# Patient Record
Sex: FEMALE | Race: WHITE | NOT HISPANIC OR LATINO | Employment: FULL TIME | URBAN - METROPOLITAN AREA
[De-identification: names, ages, dates, MRNs, and addresses within clinical notes are randomized per-mention and may not be internally consistent; named-entity substitution may affect disease eponyms.]

---

## 2021-11-17 ENCOUNTER — HOSPITAL ENCOUNTER (OUTPATIENT)
Dept: CARDIOLOGY | Facility: CLINIC | Age: 51
Discharge: HOME | End: 2021-11-17
Attending: NURSE PRACTITIONER | Admitting: NURSE PRACTITIONER
Payer: COMMERCIAL

## 2021-11-17 DIAGNOSIS — C91.12 CHRONIC LYMPHOID LEUKEMIA IN RELAPSE (H): ICD-10-CM

## 2021-11-17 LAB — LVEF ECHO: NORMAL

## 2021-11-17 PROCEDURE — 93306 TTE W/DOPPLER COMPLETE: CPT | Mod: 26 | Performed by: INTERNAL MEDICINE

## 2021-11-17 PROCEDURE — 93356 MYOCRD STRAIN IMG SPCKL TRCK: CPT

## 2021-11-18 DIAGNOSIS — C83.31 DIFFUSE LARGE B-CELL LYMPHOMA, LYMPH NODES OF HEAD, FACE, AND NECK (H): Primary | ICD-10-CM

## 2021-12-07 ENCOUNTER — OFFICE VISIT (OUTPATIENT)
Dept: CARDIOLOGY | Facility: CLINIC | Age: 51
End: 2021-12-07
Attending: NURSE PRACTITIONER
Payer: COMMERCIAL

## 2021-12-07 VITALS
BODY MASS INDEX: 28.38 KG/M2 | SYSTOLIC BLOOD PRESSURE: 107 MMHG | DIASTOLIC BLOOD PRESSURE: 74 MMHG | HEART RATE: 108 BPM | WEIGHT: 166.2 LBS | HEIGHT: 64 IN

## 2021-12-07 DIAGNOSIS — I42.9 SECONDARY CARDIOMYOPATHY (H): Primary | ICD-10-CM

## 2021-12-07 DIAGNOSIS — R00.0 TACHYCARDIA: ICD-10-CM

## 2021-12-07 DIAGNOSIS — C83.31 DIFFUSE LARGE B-CELL LYMPHOMA, LYMPH NODES OF HEAD, FACE, AND NECK (H): ICD-10-CM

## 2021-12-07 PROCEDURE — 99214 OFFICE O/P EST MOD 30 MIN: CPT | Performed by: INTERNAL MEDICINE

## 2021-12-07 PROCEDURE — 93000 ELECTROCARDIOGRAM COMPLETE: CPT | Performed by: INTERNAL MEDICINE

## 2021-12-07 RX ORDER — LOSARTAN POTASSIUM 25 MG/1
25 TABLET ORAL 2 TIMES DAILY
COMMUNITY

## 2021-12-07 RX ORDER — LORAZEPAM 1 MG/1
1 TABLET ORAL EVERY 6 HOURS PRN
COMMUNITY

## 2021-12-07 RX ORDER — VENLAFAXINE 75 MG/1
225 TABLET ORAL DAILY
COMMUNITY

## 2021-12-07 RX ORDER — LACTOBACILLUS RHAMNOSUS GG 10B CELL
1 CAPSULE ORAL DAILY
COMMUNITY

## 2021-12-07 RX ORDER — CARVEDILOL 3.12 MG/1
3.12 TABLET ORAL 2 TIMES DAILY WITH MEALS
Qty: 60 TABLET | Refills: 11 | Status: SHIPPED | OUTPATIENT
Start: 2021-12-07

## 2021-12-07 RX ORDER — HYDROCHLOROTHIAZIDE 12.5 MG/1
12.5 TABLET ORAL DAILY
COMMUNITY
End: 2021-12-07

## 2021-12-07 RX ORDER — LORAZEPAM 0.5 MG/1
0.5 TABLET ORAL EVERY 6 HOURS PRN
COMMUNITY

## 2021-12-07 RX ORDER — PROCHLORPERAZINE MALEATE 5 MG
5 TABLET ORAL EVERY 6 HOURS PRN
COMMUNITY

## 2021-12-07 RX ORDER — TRAZODONE HYDROCHLORIDE 100 MG/1
100 TABLET ORAL AT BEDTIME
COMMUNITY

## 2021-12-07 RX ORDER — PRAVASTATIN SODIUM 40 MG
40 TABLET ORAL DAILY
COMMUNITY

## 2021-12-07 RX ORDER — BUSPIRONE HYDROCHLORIDE 5 MG/1
5 TABLET ORAL 2 TIMES DAILY
COMMUNITY

## 2021-12-07 RX ORDER — PREDNISONE 20 MG/1
80 TABLET ORAL
COMMUNITY

## 2021-12-07 RX ORDER — ONDANSETRON 4 MG/1
TABLET, FILM COATED ORAL EVERY 8 HOURS PRN
COMMUNITY

## 2021-12-07 ASSESSMENT — MIFFLIN-ST. JEOR: SCORE: 1363.88

## 2021-12-07 NOTE — LETTER
12/7/2021    Candis Monk  [facility] [place] [address]  [place] [address]    RE: Jaqueline Triplett       Dear Colleague,     I had the pleasure of seeing Jaqueline Triplett in the [facility].  HPI and Plan:   See dictation    Orders Placed This Encounter   Procedures     Follow-Up with Cardiologist     EKG 12-lead complete w/read - Clinics (performed today)     Echocardiogram Limited       Orders Placed This Encounter   Medications     DISCONTD: hydrochlorothiazide (HYDRODIURIL) 12.5 MG tablet     Sig: Take 12.5 mg by mouth daily     losartan (COZAAR) 25 MG tablet     Sig: Take 25 mg by mouth 2 times daily     pravastatin (PRAVACHOL) 40 MG tablet     Sig: Take 40 mg by mouth daily     venlafaxine (EFFEXOR) 75 MG tablet     Sig: Take 225 mg by mouth daily     alum & mag hydroxide-simethicone 80 mL, diphenhydrAMINE 80 mL, lidocaine (viscous) 80 mL magic mouthwash     Sig: Take 10 mLs by mouth every 6 hours as needed for mouth sores     Cranberry 1000 MG CAPS     LORazepam (ATIVAN) 0.5 MG tablet     Sig: Take 0.5 mg by mouth every 6 hours as needed for anxiety     LORazepam (ATIVAN) 1 MG tablet     Sig: Take 1 mg by mouth every 6 hours as needed for anxiety     predniSONE (DELTASONE) 20 MG tablet     Sig: Take 100 mg by mouth 100 mg daily for 5 days, with treatment, every 21 days     busPIRone (BUSPAR) 5 MG tablet     Sig: Take 5 mg by mouth 2 times daily     traZODone (DESYREL) 100 MG tablet     Sig: Take 100 mg by mouth At Bedtime     lactobacillus rhamnosus, GG, (CULTURELL) capsule     Sig: Take 1 capsule by mouth daily     Multiple Vitamin (MULTIVITAMIN ADULT PO)     ondansetron (ZOFRAN) 4 MG tablet     Sig: Take by mouth every 8 hours as needed for nausea     prochlorperazine (COMPAZINE) 5 MG tablet     Sig: Take 5 mg by mouth every 6 hours as needed for nausea or vomiting Dose unknown     carvedilol (COREG) 3.125 MG tablet     Sig: Take 1 tablet (3.125 mg) by mouth 2 times  daily (with meals)     Dispense:  60 tablet     Refill:  11       Medications Discontinued During This Encounter   Medication Reason     hydrochlorothiazide (HYDRODIURIL) 12.5 MG tablet          Encounter Diagnoses   Name Primary?     Diffuse large b-cell lymphoma, lymph nodes of head, face, and neck (H)      Secondary cardiomyopathy (H) Yes     Tachycardia        CURRENT MEDICATIONS:  Current Outpatient Medications   Medication Sig Dispense Refill     alum & mag hydroxide-simethicone 80 mL, diphenhydrAMINE 80 mL, lidocaine (viscous) 80 mL magic mouthwash Take 10 mLs by mouth every 6 hours as needed for mouth sores       busPIRone (BUSPAR) 5 MG tablet Take 5 mg by mouth 2 times daily       carvedilol (COREG) 3.125 MG tablet Take 1 tablet (3.125 mg) by mouth 2 times daily (with meals) 60 tablet 11     Cranberry 1000 MG CAPS        lactobacillus rhamnosus, GG, (CULTURELL) capsule Take 1 capsule by mouth daily       LORazepam (ATIVAN) 0.5 MG tablet Take 0.5 mg by mouth every 6 hours as needed for anxiety       LORazepam (ATIVAN) 1 MG tablet Take 1 mg by mouth every 6 hours as needed for anxiety       losartan (COZAAR) 25 MG tablet Take 25 mg by mouth 2 times daily       Multiple Vitamin (MULTIVITAMIN ADULT PO)        ondansetron (ZOFRAN) 4 MG tablet Take by mouth every 8 hours as needed for nausea       pravastatin (PRAVACHOL) 40 MG tablet Take 40 mg by mouth daily       predniSONE (DELTASONE) 20 MG tablet Take 100 mg by mouth 100 mg daily for 5 days, with treatment, every 21 days       prochlorperazine (COMPAZINE) 5 MG tablet Take 5 mg by mouth every 6 hours as needed for nausea or vomiting Dose unknown       traZODone (DESYREL) 100 MG tablet Take 100 mg by mouth At Bedtime       venlafaxine (EFFEXOR) 75 MG tablet Take 225 mg by mouth daily         ALLERGIES   No Known Allergies    PAST MEDICAL HISTORY:  History reviewed. No pertinent past medical history.    PAST SURGICAL HISTORY:  History reviewed. No pertinent  "surgical history.    FAMILY HISTORY:  Family History   Problem Relation Age of Onset     Cerebrovascular Disease Father      Cerebrovascular Disease Brother        SOCIAL HISTORY:  Social History     Socioeconomic History     Marital status:      Spouse name: None     Number of children: None     Years of education: None     Highest education level: None   Occupational History     None   Tobacco Use     Smoking status: Former Smoker     Years: 2.00     Smokeless tobacco: Never Used   Substance and Sexual Activity     Alcohol use: Not Currently     Drug use: None     Sexual activity: None   Other Topics Concern     Parent/sibling w/ CABG, MI or angioplasty before 65F 55M? Not Asked   Social History Narrative     None     Social Determinants of Health     Financial Resource Strain: Not on file   Food Insecurity: Not on file   Transportation Needs: Not on file   Physical Activity: Not on file   Stress: Not on file   Social Connections: Not on file   Intimate Partner Violence: Not on file   Housing Stability: Not on file       Review of Systems:  Skin:  Positive for bruising     Eyes:  Positive for contacts    ENT:  Negative      Respiratory:  Negative       Cardiovascular:  Negative;chest pain;syncope or near-syncope;dizziness;lightheadedness;cyanosis palpitations;Positive for;fatigue    Gastroenterology: Negative      Genitourinary:  not assessed      Musculoskeletal:  Negative      Neurologic:  Positive for headaches    Psychiatric:  Positive for sleep disturbances    Heme/Lymph/Imm:  Positive for easy bruising    Endocrine:  Negative        Physical Exam:  Vitals: /74 (BP Location: Right arm, Cuff Size: Adult Large)   Pulse 108   Ht 1.626 m (5' 4\")   Wt 75.4 kg (166 lb 3.2 oz)   BMI 28.53 kg/m      Constitutional:  cooperative        Skin:  warm and dry to the touch          Head:  no masses or lesions        Eyes:  pupils equal and round        Lymph:      ENT:  no pallor or cyanosis        Neck:  " no carotid bruit        Respiratory:  clear to auscultation;normal symmetry         Cardiac: regular rhythm;no murmurs, gallops or rubs detected tachycardic              pulses full and equal                                        GI:  abdomen soft;no bruits        Extremities and Muscular Skeletal:  no deformities, clubbing, cyanosis, erythema observed;no edema              Neurological:  no gross motor deficits;affect appropriate        Psych:             CC  Nisa Conde, APRN CNP  [facility]  [address] [address]  [place] [address]                      Thank you for allowing me to participate in the care of your patient.      Sincerely,     Susana Hopper DO     [facility]  cc:   JACQUELINE Davidson CNP  [facility]  [address] [address]  [place] [address]

## 2021-12-07 NOTE — LETTER
12/7/2021       RE: Jaqueline Triplett  [address] [facility] [address]     Dear Colleague,    Thank you for referring your patient, Jaqueline Triplett, to the [facility] at [facility]. Please see a copy of my visit note below.    HPI and Plan:   See dictation    Orders Placed This Encounter   Procedures     Follow-Up with Cardiologist     EKG 12-lead complete w/read - Clinics (performed today)     Echocardiogram Limited       Orders Placed This Encounter   Medications     DISCONTD: hydrochlorothiazide (HYDRODIURIL) 12.5 MG tablet     Sig: Take 12.5 mg by mouth daily     losartan (COZAAR) 25 MG tablet     Sig: Take 25 mg by mouth 2 times daily     pravastatin (PRAVACHOL) 40 MG tablet     Sig: Take 40 mg by mouth daily     venlafaxine (EFFEXOR) 75 MG tablet     Sig: Take 225 mg by mouth daily     alum & mag hydroxide-simethicone 80 mL, diphenhydrAMINE 80 mL, lidocaine (viscous) 80 mL magic mouthwash     Sig: Take 10 mLs by mouth every 6 hours as needed for mouth sores     Cranberry 1000 MG CAPS     LORazepam (ATIVAN) 0.5 MG tablet     Sig: Take 0.5 mg by mouth every 6 hours as needed for anxiety     LORazepam (ATIVAN) 1 MG tablet     Sig: Take 1 mg by mouth every 6 hours as needed for anxiety     predniSONE (DELTASONE) 20 MG tablet     Sig: Take 100 mg by mouth 100 mg daily for 5 days, with treatment, every 21 days     busPIRone (BUSPAR) 5 MG tablet     Sig: Take 5 mg by mouth 2 times daily     traZODone (DESYREL) 100 MG tablet     Sig: Take 100 mg by mouth At Bedtime     lactobacillus rhamnosus, GG, (CULTURELL) capsule     Sig: Take 1 capsule by mouth daily     Multiple Vitamin (MULTIVITAMIN ADULT PO)     ondansetron (ZOFRAN) 4 MG tablet     Sig: Take by mouth every 8 hours as needed for nausea     prochlorperazine (COMPAZINE) 5 MG tablet     Sig: Take 5 mg by mouth every 6 hours as needed for nausea or vomiting Dose unknown      carvedilol (COREG) 3.125 MG tablet     Sig: Take 1 tablet (3.125 mg) by mouth 2 times daily (with meals)     Dispense:  60 tablet     Refill:  11       Medications Discontinued During This Encounter   Medication Reason     hydrochlorothiazide (HYDRODIURIL) 12.5 MG tablet          Encounter Diagnoses   Name Primary?     Diffuse large b-cell lymphoma, lymph nodes of head, face, and neck (H)      Secondary cardiomyopathy (H) Yes     Tachycardia        CURRENT MEDICATIONS:  Current Outpatient Medications   Medication Sig Dispense Refill     alum & mag hydroxide-simethicone 80 mL, diphenhydrAMINE 80 mL, lidocaine (viscous) 80 mL magic mouthwash Take 10 mLs by mouth every 6 hours as needed for mouth sores       busPIRone (BUSPAR) 5 MG tablet Take 5 mg by mouth 2 times daily       carvedilol (COREG) 3.125 MG tablet Take 1 tablet (3.125 mg) by mouth 2 times daily (with meals) 60 tablet 11     Cranberry 1000 MG CAPS        lactobacillus rhamnosus, GG, (CULTURELL) capsule Take 1 capsule by mouth daily       LORazepam (ATIVAN) 0.5 MG tablet Take 0.5 mg by mouth every 6 hours as needed for anxiety       LORazepam (ATIVAN) 1 MG tablet Take 1 mg by mouth every 6 hours as needed for anxiety       losartan (COZAAR) 25 MG tablet Take 25 mg by mouth 2 times daily       Multiple Vitamin (MULTIVITAMIN ADULT PO)        ondansetron (ZOFRAN) 4 MG tablet Take by mouth every 8 hours as needed for nausea       pravastatin (PRAVACHOL) 40 MG tablet Take 40 mg by mouth daily       predniSONE (DELTASONE) 20 MG tablet Take 100 mg by mouth 100 mg daily for 5 days, with treatment, every 21 days       prochlorperazine (COMPAZINE) 5 MG tablet Take 5 mg by mouth every 6 hours as needed for nausea or vomiting Dose unknown       traZODone (DESYREL) 100 MG tablet Take 100 mg by mouth At Bedtime       venlafaxine (EFFEXOR) 75 MG tablet Take 225 mg by mouth daily         ALLERGIES   No Known Allergies    PAST MEDICAL HISTORY:  History reviewed. No  "pertinent past medical history.    PAST SURGICAL HISTORY:  History reviewed. No pertinent surgical history.    FAMILY HISTORY:  Family History   Problem Relation Age of Onset     Cerebrovascular Disease Father      Cerebrovascular Disease Brother        SOCIAL HISTORY:  Social History     Socioeconomic History     Marital status:      Spouse name: None     Number of children: None     Years of education: None     Highest education level: None   Occupational History     None   Tobacco Use     Smoking status: Former Smoker     Years: 2.00     Smokeless tobacco: Never Used   Substance and Sexual Activity     Alcohol use: Not Currently     Drug use: None     Sexual activity: None   Other Topics Concern     Parent/sibling w/ CABG, MI or angioplasty before 65F 55M? Not Asked   Social History Narrative     None     Social Determinants of Health     Financial Resource Strain: Not on file   Food Insecurity: Not on file   Transportation Needs: Not on file   Physical Activity: Not on file   Stress: Not on file   Social Connections: Not on file   Intimate Partner Violence: Not on file   Housing Stability: Not on file       Review of Systems:  Skin:  Positive for bruising     Eyes:  Positive for contacts    ENT:  Negative      Respiratory:  Negative       Cardiovascular:  Negative;chest pain;syncope or near-syncope;dizziness;lightheadedness;cyanosis palpitations;Positive for;fatigue    Gastroenterology: Negative      Genitourinary:  not assessed      Musculoskeletal:  Negative      Neurologic:  Positive for headaches    Psychiatric:  Positive for sleep disturbances    Heme/Lymph/Imm:  Positive for easy bruising    Endocrine:  Negative        Physical Exam:  Vitals: /74 (BP Location: Right arm, Cuff Size: Adult Large)   Pulse 108   Ht 1.626 m (5' 4\")   Wt 75.4 kg (166 lb 3.2 oz)   BMI 28.53 kg/m      Constitutional:  cooperative        Skin:  warm and dry to the touch          Head:  no masses or lesions    "     Eyes:  pupils equal and round        Lymph:      ENT:  no pallor or cyanosis        Neck:  no carotid bruit        Respiratory:  clear to auscultation;normal symmetry         Cardiac: regular rhythm;no murmurs, gallops or rubs detected tachycardic              pulses full and equal                                        GI:  abdomen soft;no bruits        Extremities and Muscular Skeletal:  no deformities, clubbing, cyanosis, erythema observed;no edema              Neurological:  no gross motor deficits;affect appropriate        Psych:             CC  Nisa Shell Houselog, APRN [facility]  [address]  [place] [address]                    Service Date: 12/07/2021    REFERRING PROVIDER:  Dr. Candis Monk.    HISTORY OF PRESENT ILLNESS:  I have been asked to evaluate this very pleasant 51-year-old female for the above.  She has a history of chronic lymphocytic leukemia that has transformed into large cell lymphoma.  She initially was treated down in [place] when she was diagnosed in July with cyclophosphamide, fludarabine and rituximab.  She did have a dose adjustment due to some intolerance.  She then moved back to [place] and was seen in Medical Oncology in October, and that is where she was diagnosed with diffuse large B-cell lymphoma and underwent a PET scan showing several areas of increased activity and lymph node biopsy 10/29 showing the diffuse large B-cell lymphoma.  She underwent a MUGA scan on 11/05, and then she underwent an echocardiogram on 11/17, preceding her first R-CHOP therapy that was given on the 18th.  The MUGA scan, which was done down in [place] I believe, did show mild LV dysfunction which was global.  The EF was estimated around 47%.  On the echocardiogram from the 17th, her EF was estimated at 50%-55% with borderline global hypokinesia.  Her global strain was slightly low at -17.6.  RV function looked normal, and there were no valvular  abnormalities and no pericardial effusion was noted.  She did have significant side effects from her first CHOP cycle including mucous ulcerations in her mouth, high fever of unknown origin, nausea and lack of appetite.  This lasted for about a week before it subsided.  She is currently not complaining of any symptoms other than tiredness.  She has no prior history of heart disease, although she does have a family history.  Her father ended up having a heart transplant in his 60s.  She has a younger brother that has had a stroke.  She has a very remote tobacco history.  No history of diabetes.  She is on 2 medications for blood pressure control, which she has been on for several years, including losartan and hydrochlorothiazide.  She tells me she had a stress test years ago, and she believes she may have had an echocardiogram years ago as well.  She was never told these were abnormal.  We did perform an electrocardiogram in office today, which I have reviewed.  This essentially shows a sinus tachycardia but no acute ST or T-wave abnormalities, axis is normal, QT corrected interval is also normal.    PHYSICAL EXAMINATION:    VITAL SIGNS:  On exam today, her blood pressure is 107/74, pulse is 108.  She does tell me that she has always had a high pulse rate.  Her weight is 166 pounds with a body mass index of 28.  NECK:  Carotid upstrokes are brisk without bruit.  CARDIOVASCULAR:  Heart tones are regular but fast.  I did not appreciate a murmur, gallop or rub.  RESPIRATORY:  Lungs are clear posteriorly.  EXTREMITIES:  She has no peripheral edema.    ASSESSMENT AND PLAN:  In summary, Ms. Triplett is a very pleasant 51-year-old female with a history of chronic lymphocytic leukemia, transformed into large diffuse B-cell lymphoma.  She had initially undergone therapy with cyclophosphamide, fludarabine and rituximab down in [place], and when she was diagnosed with the B-cell lymphoma, she has started now started CHOP  therapy, which her first of 6 cycles was performed on .  She has borderline low LV systolic function with no prior history of heart disease.  Her risk factors include family history of coronary disease and hypertension.  At this time, I am going to recommend the following:  I would like her to have a troponin level drawn within 24-48 hours after her second cycle, and I have written her a lab script for this.  I would like to change her antihypertensive regimen to be a little bit more cardioprotective.  I do not feel she is going to tolerate a diuretic well with her side effects she has been experiencing with the chemo, so I am going to take her off of hydrochlorothiazide and put her on low-dose carvedilol 3.125 mg b.i.d.  I will have her continue the losartan at 25 mg b.i.d., but if her blood pressure becomes too low or she gets lightheaded, I may back off on this to allow the addition of the carvedilol.  I have asked her to purchase a blood pressure cuff machine, if she hasn't already, to help guide us when she is having significant side effects, to make sure that she can tolerate these medications without any significant hypotension, and I will ask her to also undergo a limited echo following her second cycle, given that we are starting borderline low with her EF and global strain pattern.  Otherwise, I will plan to see her back every 3 months.    Please feel free to contact me with any questions you have in regards to her care.  Thank you for allowing me to participate in the care of your nice patient.    Susana Hopper DO    cc:  [place] [address]    Mehdi Temple MD  [facility] [facility]  [address] [place] [address], [address]  [place]  [address]    Susana Hopper DO        D: 2021   T: 2021   MT: singh    Name:     GERA [place]  MRN:      [id number]        Account:      [id number]   :       1970           Service Date: 12/07/2021       Document: [id number]

## 2021-12-07 NOTE — PROGRESS NOTES
Service Date: 12/07/2021    REFERRING PROVIDER:  Dr. Candis Monk.    HISTORY OF PRESENT ILLNESS:  I have been asked to evaluate this very pleasant 51-year-old female for the above.  She has a history of chronic lymphocytic leukemia that has transformed into large cell lymphoma.  She initially was treated down in [place] when she was diagnosed in July with cyclophosphamide, fludarabine and rituximab.  She did have a dose adjustment due to some intolerance.  She then moved back to [place] and was seen in Medical Oncology in October, and that is where she was diagnosed with diffuse large B-cell lymphoma and underwent a PET scan showing several areas of increased activity and lymph node biopsy 10/29 showing the diffuse large B-cell lymphoma.  She underwent a MUGA scan on 11/05, and then she underwent an echocardiogram on 11/17, preceding her first R-CHOP therapy that was given on the 18th.  The MUGA scan, which was done down in [place] I believe, did show mild LV dysfunction which was global.  The EF was estimated around 47%.  On the echocardiogram from the 17th, her EF was estimated at 50%-55% with borderline global hypokinesia.  Her global strain was slightly low at -17.6.  RV function looked normal, and there were no valvular abnormalities and no pericardial effusion was noted.  She did have significant side effects from her first CHOP cycle including mucous ulcerations in her mouth, high fever of unknown origin, nausea and lack of appetite.  This lasted for about a week before it subsided.  She is currently not complaining of any symptoms other than tiredness.  She has no prior history of heart disease, although she does have a family history.  Her father ended up having a heart transplant in his 60s.  She has a younger brother that has had a stroke.  She has a very remote tobacco history.  No history of diabetes.  She is on 2 medications for blood pressure control, which she has been on for several years,  including losartan and hydrochlorothiazide.  She tells me she had a stress test years ago, and she believes she may have had an echocardiogram years ago as well.  She was never told these were abnormal.  We did perform an electrocardiogram in office today, which I have reviewed.  This essentially shows a sinus tachycardia but no acute ST or T-wave abnormalities, axis is normal, QT corrected interval is also normal.    PHYSICAL EXAMINATION:    VITAL SIGNS:  On exam today, her blood pressure is 107/74, pulse is 108.  She does tell me that she has always had a high pulse rate.  Her weight is 166 pounds with a body mass index of 28.  NECK:  Carotid upstrokes are brisk without bruit.  CARDIOVASCULAR:  Heart tones are regular but fast.  I did not appreciate a murmur, gallop or rub.  RESPIRATORY:  Lungs are clear posteriorly.  EXTREMITIES:  She has no peripheral edema.    ASSESSMENT AND PLAN:  In summary, Ms. Triplett is a very pleasant 51-year-old female with a history of chronic lymphocytic leukemia, transformed into large diffuse B-cell lymphoma.  She had initially undergone therapy with cyclophosphamide, fludarabine and rituximab down in [place], and when she was diagnosed with the B-cell lymphoma, she has started now started CHOP therapy, which her first of 6 cycles was performed on 11/18.  She has borderline low LV systolic function with no prior history of heart disease.  Her risk factors include family history of coronary disease and hypertension.  At this time, I am going to recommend the following:  I would like her to have a troponin level drawn within 24-48 hours after her second cycle, and I have written her a lab script for this.  I would like to change her antihypertensive regimen to be a little bit more cardioprotective.  I do not feel she is going to tolerate a diuretic well with her side effects she has been experiencing with the chemo, so I am going to take her off of hydrochlorothiazide and put her on  low-dose carvedilol 3.125 mg b.i.d.  I will have her continue the losartan at 25 mg b.i.d., but if her blood pressure becomes too low or she gets lightheaded, I may back off on this to allow the addition of the carvedilol.  I have asked her to purchase a blood pressure cuff machine, if she hasn't already, to help guide us when she is having significant side effects, to make sure that she can tolerate these medications without any significant hypotension, and I will ask her to also undergo a limited echo following her second cycle, given that we are starting borderline low with her EF and global strain pattern.  Otherwise, I will plan to see her back every 3 months.    Please feel free to contact me with any questions you have in regards to her care.  Thank you for allowing me to participate in the care of your nice patient.    Susana Hopper DO    cc:  [place] [address]    Mehdi Temple MD  [facility]  [address] [place] [address], [address]  [place] [address]    Susana Hopper DO        D: 2021   T: 2021   MT: singh    Name:     GERA NELSONDarby  MRN:      [id number]        Account:      [id number]   :      1970           Service Date: 2021       Document: [id number]

## 2021-12-07 NOTE — PROGRESS NOTES
HPI and Plan:   See dictation    Orders Placed This Encounter   Procedures     Follow-Up with Cardiologist     EKG 12-lead complete w/read - Clinics (performed today)     Echocardiogram Limited       Orders Placed This Encounter   Medications     DISCONTD: hydrochlorothiazide (HYDRODIURIL) 12.5 MG tablet     Sig: Take 12.5 mg by mouth daily     losartan (COZAAR) 25 MG tablet     Sig: Take 25 mg by mouth 2 times daily     pravastatin (PRAVACHOL) 40 MG tablet     Sig: Take 40 mg by mouth daily     venlafaxine (EFFEXOR) 75 MG tablet     Sig: Take 225 mg by mouth daily     alum & mag hydroxide-simethicone 80 mL, diphenhydrAMINE 80 mL, lidocaine (viscous) 80 mL magic mouthwash     Sig: Take 10 mLs by mouth every 6 hours as needed for mouth sores     Cranberry 1000 MG CAPS     LORazepam (ATIVAN) 0.5 MG tablet     Sig: Take 0.5 mg by mouth every 6 hours as needed for anxiety     LORazepam (ATIVAN) 1 MG tablet     Sig: Take 1 mg by mouth every 6 hours as needed for anxiety     predniSONE (DELTASONE) 20 MG tablet     Sig: Take 100 mg by mouth 100 mg daily for 5 days, with treatment, every 21 days     busPIRone (BUSPAR) 5 MG tablet     Sig: Take 5 mg by mouth 2 times daily     traZODone (DESYREL) 100 MG tablet     Sig: Take 100 mg by mouth At Bedtime     lactobacillus rhamnosus, GG, (CULTURELL) capsule     Sig: Take 1 capsule by mouth daily     Multiple Vitamin (MULTIVITAMIN ADULT PO)     ondansetron (ZOFRAN) 4 MG tablet     Sig: Take by mouth every 8 hours as needed for nausea     prochlorperazine (COMPAZINE) 5 MG tablet     Sig: Take 5 mg by mouth every 6 hours as needed for nausea or vomiting Dose unknown     carvedilol (COREG) 3.125 MG tablet     Sig: Take 1 tablet (3.125 mg) by mouth 2 times daily (with meals)     Dispense:  60 tablet     Refill:  11       Medications Discontinued During This Encounter   Medication Reason     hydrochlorothiazide (HYDRODIURIL) 12.5 MG tablet          Encounter Diagnoses   Name Primary?      Diffuse large b-cell lymphoma, lymph nodes of head, face, and neck (H)      Secondary cardiomyopathy (H) Yes     Tachycardia        CURRENT MEDICATIONS:  Current Outpatient Medications   Medication Sig Dispense Refill     alum & mag hydroxide-simethicone 80 mL, diphenhydrAMINE 80 mL, lidocaine (viscous) 80 mL magic mouthwash Take 10 mLs by mouth every 6 hours as needed for mouth sores       busPIRone (BUSPAR) 5 MG tablet Take 5 mg by mouth 2 times daily       carvedilol (COREG) 3.125 MG tablet Take 1 tablet (3.125 mg) by mouth 2 times daily (with meals) 60 tablet 11     Cranberry 1000 MG CAPS        lactobacillus rhamnosus, GG, (CULTURELL) capsule Take 1 capsule by mouth daily       LORazepam (ATIVAN) 0.5 MG tablet Take 0.5 mg by mouth every 6 hours as needed for anxiety       LORazepam (ATIVAN) 1 MG tablet Take 1 mg by mouth every 6 hours as needed for anxiety       losartan (COZAAR) 25 MG tablet Take 25 mg by mouth 2 times daily       Multiple Vitamin (MULTIVITAMIN ADULT PO)        ondansetron (ZOFRAN) 4 MG tablet Take by mouth every 8 hours as needed for nausea       pravastatin (PRAVACHOL) 40 MG tablet Take 40 mg by mouth daily       predniSONE (DELTASONE) 20 MG tablet Take 100 mg by mouth 100 mg daily for 5 days, with treatment, every 21 days       prochlorperazine (COMPAZINE) 5 MG tablet Take 5 mg by mouth every 6 hours as needed for nausea or vomiting Dose unknown       traZODone (DESYREL) 100 MG tablet Take 100 mg by mouth At Bedtime       venlafaxine (EFFEXOR) 75 MG tablet Take 225 mg by mouth daily         ALLERGIES   No Known Allergies    PAST MEDICAL HISTORY:  History reviewed. No pertinent past medical history.    PAST SURGICAL HISTORY:  History reviewed. No pertinent surgical history.    FAMILY HISTORY:  Family History   Problem Relation Age of Onset     Cerebrovascular Disease Father      Cerebrovascular Disease Brother        SOCIAL HISTORY:  Social History     Socioeconomic History     Marital  "status:      Spouse name: None     Number of children: None     Years of education: None     Highest education level: None   Occupational History     None   Tobacco Use     Smoking status: Former Smoker     Years: 2.00     Smokeless tobacco: Never Used   Substance and Sexual Activity     Alcohol use: Not Currently     Drug use: None     Sexual activity: None   Other Topics Concern     Parent/sibling w/ CABG, MI or angioplasty before 65F 55M? Not Asked   Social History Narrative     None     Social Determinants of Health     Financial Resource Strain: Not on file   Food Insecurity: Not on file   Transportation Needs: Not on file   Physical Activity: Not on file   Stress: Not on file   Social Connections: Not on file   Intimate Partner Violence: Not on file   Housing Stability: Not on file       Review of Systems:  Skin:  Positive for bruising     Eyes:  Positive for contacts    ENT:  Negative      Respiratory:  Negative       Cardiovascular:  Negative;chest pain;syncope or near-syncope;dizziness;lightheadedness;cyanosis palpitations;Positive for;fatigue    Gastroenterology: Negative      Genitourinary:  not assessed      Musculoskeletal:  Negative      Neurologic:  Positive for headaches    Psychiatric:  Positive for sleep disturbances    Heme/Lymph/Imm:  Positive for easy bruising    Endocrine:  Negative        Physical Exam:  Vitals: /74 (BP Location: Right arm, Cuff Size: Adult Large)   Pulse 108   Ht 1.626 m (5' 4\")   Wt 75.4 kg (166 lb 3.2 oz)   BMI 28.53 kg/m      Constitutional:  cooperative        Skin:  warm and dry to the touch          Head:  no masses or lesions        Eyes:  pupils equal and round        Lymph:      ENT:  no pallor or cyanosis        Neck:  no carotid bruit        Respiratory:  clear to auscultation;normal symmetry         Cardiac: regular rhythm;no murmurs, gallops or rubs detected tachycardic              pulses full and equal                                    "     GI:  abdomen soft;no bruits        Extremities and Muscular Skeletal:  no deformities, clubbing, cyanosis, erythema observed;no edema              Neurological:  no gross motor deficits;affect appropriate        Psych:             CC  Nisa Geronimolog, APRN CNP  [facility]  [address] [facility] [address]  [place] [address]

## 2021-12-10 ENCOUNTER — TRANSFERRED RECORDS (OUTPATIENT)
Dept: HEALTH INFORMATION MANAGEMENT | Facility: CLINIC | Age: 51
End: 2021-12-10
Payer: COMMERCIAL

## 2021-12-13 ENCOUNTER — TELEPHONE (OUTPATIENT)
Dept: CARDIOLOGY | Facility: CLINIC | Age: 51
End: 2021-12-13
Payer: COMMERCIAL

## 2021-12-13 DIAGNOSIS — I42.9 SECONDARY CARDIOMYOPATHY (H): Primary | ICD-10-CM

## 2021-12-13 NOTE — TELEPHONE ENCOUNTER
"Received call from pt stating she is having fluid retention and shortness of breath since making medication changes after her visit on 12/7/21 with Dr. Hopper. Per note:     \"At this time, I am going to recommend the following:  I would like her to have a troponin level drawn within 24-48 hours after her second cycle, and I have written her a lab script for this.  I would like to change her antihypertensive regimen to be a little bit more cardioprotective.  I do not feel she is going to tolerate a diuretic well with her side effects she has been experiencing with the chemo, so I am going to take her off of hydrochlorothiazide and put her on low-dose carvedilol 3.125 mg b.i.d.  I will have her continue the losartan at 25 mg b.i.d., but if her blood pressure becomes too low or she gets lightheaded, I may back off on this to allow the addition of the carvedilol.  I have asked her to purchase a blood pressure cuff machine, if she hasn't already, to help guide us when she is having significant side effects, to make sure that she can tolerate these medications without any significant hypotension, and I will ask her to also undergo a limited echo following her second cycle, given that we are starting borderline low with her EF and global strain pattern.\"    Pt stated she has noted some new shortness of breath on exertion such as when climbing stairs in her home the past few days. Pt stated she also feels like she is retaining fluid. Pt stated her weight on her home scale this morning was 171 lbs which is up about 5 lbs in the past week. Pt's weight in clinic on 12/7/21 was 166 lbs. Pt said her rings are tight and she has also noted some indentation in her skin when she removes her socks. Pt stated she is checking her BP at home, reported her BP today was 103/57 with HR 90. Pt asked if she should go back on the hydrochlorothiazide or make other medication changes. Advised can review with Dr. Hopper and call back with " recommendations.

## 2021-12-14 RX ORDER — HYDROCHLOROTHIAZIDE 12.5 MG/1
12.5 TABLET ORAL DAILY
Qty: 90 TABLET | Refills: 3
Start: 2021-12-14

## 2021-12-14 NOTE — TELEPHONE ENCOUNTER
Received response from Dr. Hopper:     Susana Hopper, Josephine Roach, RN  Caller: Unspecified (Yesterday, 10:22 AM)  She can try adding the hydrochlorothiazide, however I would continue coreg and losartan they are cardioprotective while she is undergoing chemo.     Called back and spoke with pt, reviewed recommendation from Dr. Hopper that pt can try adding back the hydrochlorothiazide 12.5 mg daily. Reviewed Dr. Hopper recommended continuing carvedilol 3.125 mg twice daily and losartan 25 mg twice daily as they are cardioprotective while pt is undergoing chemo. Requested pt let us know if she has issues with low BP. Pt verbalized understanding and agreement with plan. Pt stated she does not need a prescription for hydrochlorothiazide at this time. Med list update.

## 2021-12-29 ENCOUNTER — HOSPITAL ENCOUNTER (OUTPATIENT)
Dept: CARDIOLOGY | Facility: CLINIC | Age: 51
Discharge: HOME | End: 2021-12-29
Attending: INTERNAL MEDICINE | Admitting: INTERNAL MEDICINE
Payer: COMMERCIAL

## 2021-12-29 DIAGNOSIS — R00.0 TACHYCARDIA: ICD-10-CM

## 2021-12-29 DIAGNOSIS — I42.9 SECONDARY CARDIOMYOPATHY (H): ICD-10-CM

## 2021-12-29 DIAGNOSIS — C83.31 DIFFUSE LARGE B-CELL LYMPHOMA, LYMPH NODES OF HEAD, FACE, AND NECK (H): ICD-10-CM

## 2021-12-29 LAB — BI-PLANE LVEF ECHO: NORMAL

## 2021-12-29 PROCEDURE — 93325 DOPPLER ECHO COLOR FLOW MAPG: CPT | Mod: 26 | Performed by: INTERNAL MEDICINE

## 2021-12-29 PROCEDURE — 93308 TTE F-UP OR LMTD: CPT | Mod: 26 | Performed by: INTERNAL MEDICINE

## 2021-12-29 PROCEDURE — 93321 DOPPLER ECHO F-UP/LMTD STD: CPT | Mod: 26 | Performed by: INTERNAL MEDICINE

## 2021-12-29 PROCEDURE — 93321 DOPPLER ECHO F-UP/LMTD STD: CPT

## 2021-12-30 ENCOUNTER — TRANSFERRED RECORDS (OUTPATIENT)
Dept: HEALTH INFORMATION MANAGEMENT | Facility: CLINIC | Age: 51
End: 2021-12-30
Payer: COMMERCIAL

## 2022-01-11 ENCOUNTER — OFFICE VISIT (OUTPATIENT)
Dept: CARDIOLOGY | Facility: CLINIC | Age: 52
End: 2022-01-11
Attending: INTERNAL MEDICINE
Payer: COMMERCIAL

## 2022-01-11 VITALS
HEART RATE: 96 BPM | WEIGHT: 171.3 LBS | HEIGHT: 64 IN | SYSTOLIC BLOOD PRESSURE: 111 MMHG | DIASTOLIC BLOOD PRESSURE: 76 MMHG | BODY MASS INDEX: 29.24 KG/M2

## 2022-01-11 DIAGNOSIS — C83.31 DIFFUSE LARGE B-CELL LYMPHOMA, LYMPH NODES OF HEAD, FACE, AND NECK (H): ICD-10-CM

## 2022-01-11 DIAGNOSIS — I42.9 SECONDARY CARDIOMYOPATHY (H): ICD-10-CM

## 2022-01-11 DIAGNOSIS — R00.0 TACHYCARDIA: ICD-10-CM

## 2022-01-11 PROCEDURE — 99214 OFFICE O/P EST MOD 30 MIN: CPT | Performed by: INTERNAL MEDICINE

## 2022-01-11 RX ORDER — ACETAMINOPHEN 500 MG
1000 TABLET ORAL DAILY
COMMUNITY

## 2022-01-11 ASSESSMENT — MIFFLIN-ST. JEOR: SCORE: 1387.01

## 2022-01-11 NOTE — LETTER
1/11/2022    Candis Monk  [facility] [address] [place], [place] [address]  [facility] [address]    RE: Jaqueline Triplett       Dear Colleague,     I had the pleasure of seeing Jaqueline Triplett in the [facility].  HPI and Plan:   See dictation    No orders of the defined types were placed in this encounter.      Orders Placed This Encounter   Medications     acetaminophen (TYLENOL) 500 MG tablet     Sig: Take 1,000 mg by mouth daily       There are no discontinued medications.      Encounter Diagnoses   Name Primary?     Diffuse large b-cell lymphoma, lymph nodes of head, face, and neck (H)      Secondary cardiomyopathy (H)      Tachycardia        CURRENT MEDICATIONS:  Current Outpatient Medications   Medication Sig Dispense Refill     acetaminophen (TYLENOL) 500 MG tablet Take 1,000 mg by mouth daily       alum & mag hydroxide-simethicone 80 mL, diphenhydrAMINE 80 mL, lidocaine (viscous) 80 mL magic mouthwash Take 10 mLs by mouth every 6 hours as needed for mouth sores       busPIRone (BUSPAR) 5 MG tablet Take 5 mg by mouth 2 times daily       carvedilol (COREG) 3.125 MG tablet Take 1 tablet (3.125 mg) by mouth 2 times daily (with meals) 60 tablet 11     Cranberry 1000 MG CAPS        hydrochlorothiazide (HYDRODIURIL) 12.5 MG tablet Take 1 tablet (12.5 mg) by mouth daily 90 tablet 3     lactobacillus rhamnosus, GG, (CULTURELL) capsule Take 1 capsule by mouth daily       LORazepam (ATIVAN) 0.5 MG tablet Take 0.5 mg by mouth every 6 hours as needed for anxiety       LORazepam (ATIVAN) 1 MG tablet Take 1 mg by mouth every 6 hours as needed for anxiety       losartan (COZAAR) 25 MG tablet Take 25 mg by mouth 2 times daily       Multiple Vitamin (MULTIVITAMIN ADULT PO)        ondansetron (ZOFRAN) 4 MG tablet Take by mouth every 8 hours as needed for nausea       pravastatin (PRAVACHOL) 40 MG tablet Take 40 mg by mouth daily       predniSONE (DELTASONE) 20 MG tablet Take 100 mg by mouth 100 mg daily for 5  days, with treatment, every 21 days       prochlorperazine (COMPAZINE) 5 MG tablet Take 5 mg by mouth every 6 hours as needed for nausea or vomiting Dose unknown       traZODone (DESYREL) 100 MG tablet Take 100 mg by mouth At Bedtime       venlafaxine (EFFEXOR) 75 MG tablet Take 225 mg by mouth daily         ALLERGIES   No Known Allergies    PAST MEDICAL HISTORY:  History reviewed. No pertinent past medical history.    PAST SURGICAL HISTORY:  History reviewed. No pertinent surgical history.    FAMILY HISTORY:  Family History   Problem Relation Age of Onset     Cerebrovascular Disease Father      Cerebrovascular Disease Brother        SOCIAL HISTORY:  Social History     Socioeconomic History     Marital status:      Spouse name: None     Number of children: None     Years of education: None     Highest education level: None   Occupational History     None   Tobacco Use     Smoking status: Former Smoker     Years: 2.00     Smokeless tobacco: Never Used   Substance and Sexual Activity     Alcohol use: Not Currently     Drug use: None     Sexual activity: None   Other Topics Concern     Parent/sibling w/ CABG, MI or angioplasty before 65F 55M? Not Asked   Social History Narrative     None     Social Determinants of Health     Financial Resource Strain: Not on file   Food Insecurity: Not on file   Transportation Needs: Not on file   Physical Activity: Not on file   Stress: Not on file   Social Connections: Not on file   Intimate Partner Violence: Not on file   Housing Stability: Not on file       Review of Systems:  Skin:  Positive for rash     Eyes:  Negative      ENT:  Negative      Respiratory:  Positive for dyspnea on exertion     Cardiovascular:  Negative;palpitations;chest pain;lightheadedness;dizziness;syncope or near-syncope;cyanosis;edema Positive for;fatigue    Gastroenterology: Positive for constipation    Genitourinary:  Negative      Musculoskeletal:  Negative      Neurologic:  Positive for headaches  "   Psychiatric:  Positive for anxiety;depression;excessive stress;sleep disturbances    Heme/Lymph/Imm:  Negative      Endocrine:  Negative        Physical Exam:  Vitals: /76 (BP Location: Left arm, Cuff Size: Adult Large)   Pulse 96   Ht 1.626 m (5' 4\")   Wt 77.7 kg (171 lb 4.8 oz)   BMI 29.40 kg/m      Constitutional:  cooperative        Skin:  warm and dry to the touch          Head:  no masses or lesions        Eyes:  pupils equal and round        Lymph:      ENT:  no pallor or cyanosis        Neck:  no carotid bruit        Respiratory:  clear to auscultation;normal symmetry         Cardiac: regular rhythm;no murmurs, gallops or rubs detected tachycardic              pulses full and equal                                        GI:  abdomen soft;no bruits        Extremities and Muscular Skeletal:  no deformities, clubbing, cyanosis, erythema observed;no edema              Neurological:  no gross motor deficits;affect appropriate        Psych:         CC  Susana Hopper DO  [address]  [place] [address]    Thank you for allowing me to participate in the care of your patient.      Sincerely,     Susana Hopper DO   [facility]  "

## 2022-01-11 NOTE — PROGRESS NOTES
HPI and Plan:   See dictation    No orders of the defined types were placed in this encounter.      Orders Placed This Encounter   Medications     acetaminophen (TYLENOL) 500 MG tablet     Sig: Take 1,000 mg by mouth daily       There are no discontinued medications.      Encounter Diagnoses   Name Primary?     Diffuse large b-cell lymphoma, lymph nodes of head, face, and neck (H)      Secondary cardiomyopathy (H)      Tachycardia        CURRENT MEDICATIONS:  Current Outpatient Medications   Medication Sig Dispense Refill     acetaminophen (TYLENOL) 500 MG tablet Take 1,000 mg by mouth daily       alum & mag hydroxide-simethicone 80 mL, diphenhydrAMINE 80 mL, lidocaine (viscous) 80 mL magic mouthwash Take 10 mLs by mouth every 6 hours as needed for mouth sores       busPIRone (BUSPAR) 5 MG tablet Take 5 mg by mouth 2 times daily       carvedilol (COREG) 3.125 MG tablet Take 1 tablet (3.125 mg) by mouth 2 times daily (with meals) 60 tablet 11     Cranberry 1000 MG CAPS        hydrochlorothiazide (HYDRODIURIL) 12.5 MG tablet Take 1 tablet (12.5 mg) by mouth daily 90 tablet 3     lactobacillus rhamnosus, GG, (CULTURELL) capsule Take 1 capsule by mouth daily       LORazepam (ATIVAN) 0.5 MG tablet Take 0.5 mg by mouth every 6 hours as needed for anxiety       LORazepam (ATIVAN) 1 MG tablet Take 1 mg by mouth every 6 hours as needed for anxiety       losartan (COZAAR) 25 MG tablet Take 25 mg by mouth 2 times daily       Multiple Vitamin (MULTIVITAMIN ADULT PO)        ondansetron (ZOFRAN) 4 MG tablet Take by mouth every 8 hours as needed for nausea       pravastatin (PRAVACHOL) 40 MG tablet Take 40 mg by mouth daily       predniSONE (DELTASONE) 20 MG tablet Take 100 mg by mouth 100 mg daily for 5 days, with treatment, every 21 days       prochlorperazine (COMPAZINE) 5 MG tablet Take 5 mg by mouth every 6 hours as needed for nausea or vomiting Dose unknown       traZODone (DESYREL) 100 MG tablet Take 100 mg by mouth At  Bedtime       venlafaxine (EFFEXOR) 75 MG tablet Take 225 mg by mouth daily         ALLERGIES   No Known Allergies    PAST MEDICAL HISTORY:  History reviewed. No pertinent past medical history.    PAST SURGICAL HISTORY:  History reviewed. No pertinent surgical history.    FAMILY HISTORY:  Family History   Problem Relation Age of Onset     Cerebrovascular Disease Father      Cerebrovascular Disease Brother        SOCIAL HISTORY:  Social History     Socioeconomic History     Marital status:      Spouse name: None     Number of children: None     Years of education: None     Highest education level: None   Occupational History     None   Tobacco Use     Smoking status: Former Smoker     Years: 2.00     Smokeless tobacco: Never Used   Substance and Sexual Activity     Alcohol use: Not Currently     Drug use: None     Sexual activity: None   Other Topics Concern     Parent/sibling w/ CABG, MI or angioplasty before 65F 55M? Not Asked   Social History Narrative     None     Social Determinants of Health     Financial Resource Strain: Not on file   Food Insecurity: Not on file   Transportation Needs: Not on file   Physical Activity: Not on file   Stress: Not on file   Social Connections: Not on file   Intimate Partner Violence: Not on file   Housing Stability: Not on file       Review of Systems:  Skin:  Positive for rash     Eyes:  Negative      ENT:  Negative      Respiratory:  Positive for dyspnea on exertion     Cardiovascular:  Negative;palpitations;chest pain;lightheadedness;dizziness;syncope or near-syncope;cyanosis;edema Positive for;fatigue    Gastroenterology: Positive for constipation    Genitourinary:  Negative      Musculoskeletal:  Negative      Neurologic:  Positive for headaches    Psychiatric:  Positive for anxiety;depression;excessive stress;sleep disturbances    Heme/Lymph/Imm:  Negative      Endocrine:  Negative        Physical Exam:  Vitals: /76 (BP Location: Left arm, Cuff Size: Adult  "Large)   Pulse 96   Ht 1.626 m (5' 4\")   Wt 77.7 kg (171 lb 4.8 oz)   BMI 29.40 kg/m      Constitutional:  cooperative        Skin:  warm and dry to the touch          Head:  no masses or lesions        Eyes:  pupils equal and round        Lymph:      ENT:  no pallor or cyanosis        Neck:  no carotid bruit        Respiratory:  clear to auscultation;normal symmetry         Cardiac: regular rhythm;no murmurs, gallops or rubs detected tachycardic              pulses full and equal                                        GI:  abdomen soft;no bruits        Extremities and Muscular Skeletal:  no deformities, clubbing, cyanosis, erythema observed;no edema              Neurological:  no gross motor deficits;affect appropriate        Psych:             CC  Susana Hopper, DO  [address]  [place] [address]                  "

## 2022-01-11 NOTE — PROGRESS NOTES
Service Date: 01/11/2022    REFERRING PROVIDER:  Dr. Candis Monk.    HISTORY OF PRESENT ILLNESS:  Ms. Triplett is a pleasant 51-year-old female with a history of large diffuse B-cell lymphoma.  She is undergoing CHOP therapy.  She has completed 3 of 6 cycles, with her sixth cycle scheduled to be completed 03/03.  She initially had a lot of difficulty with oral ulcers and nausea and lack of appetite.  This is slowly getting better and less prominent with her subsequent cycles.  I did ask her to undergo a troponin level with her last cycle and repeated an echocardiogram.  She comes in today for those test results.  Her troponin level was within normal range, and her recent echo 12/29 shows stable LV function.  When she was being treated down in [place] initially, she had mild LV dysfunction by MUGA scan.  Her EF was estimated around 47%.  We have been monitoring her heart function with echocardiogram, and her initial EF on her first echo was 50%-55% with mildly abnormal global strain of -17.  The echo from the 29th shows stable function, her biplane EF was 56% and global strain remained mildly abnormal at -17.  She has normal cardiac valves.  She is not experiencing any chest pain or palpitations.  She states she has always had heart rate on the higher end.  I did transition her to carvedilol in addition to losartan.  She did end up going back on hydrochlorothiazide.  I had initially recommended exchanging hydrochlorothiazide for carvedilol, but she did develop some increased swelling and ended up going back on low-dose hydrochlorothiazide.  This has helped with the swelling and she believes a little bit with her breathing.  She has noticed some increased work of breathing with moderate exertion.  Her last blood counts drawn today show a hemoglobin of 8.7, white count was within normal range at 4.6, platelets are within normal range at 153.  She is on Neulasta.    PHYSICAL EXAMINATION:    VITAL SIGNS:  Blood  pressure today was 111/76, pulse of 96.  Weight is 171.  This is up about 6 pounds from her previous visit.  CARDIOVASCULAR:  Tones today were regular suggesting a normal sinus rhythm.  I did not appreciate a murmur, gallop or rub.    ASSESSMENT AND PLAN:  In summary, Ms. Nelson is a pleasant 51-year-old female undergoing CHOP therapy for diffuse large-cell B-cell lymphoma.  She has completed 3 of 6 cycles.  Her last cycle is scheduled to be completed on .  She has maintained a stable LV function with borderline low-normal EF.  She is tolerating low-dose carvedilol in addition to losartan and hydrochlorothiazide.  Once again, I did caution her with the continued use of hydrochlorothiazide about dehydration, especially undergoing chemo agents that can cause nausea, lack of appetite and poor oral intake.  She has been really good about fluid intake and maintains this after her cycles, so we will continue with her current regimen and I will recommend to see her back after her cycles have been completed in March with a limited echo at that time.    Please feel free to contact me with any questions you have in regards to her care.    Susana Hopper DO    cc:  [place] [address]    Mehdi Temple MD  [facility]  [address] [place] [address], [place] [address]    Susana Hopper DO        D: 2022   T: 2022   MT: singh    Name:     GERA NELSON  MRN:      [id number]        Account:      [id number]   :      1970           Service Date: 2022       Document: [id number]

## 2022-02-10 ENCOUNTER — TRANSFERRED RECORDS (OUTPATIENT)
Dept: HEALTH INFORMATION MANAGEMENT | Facility: CLINIC | Age: 52
End: 2022-02-10
Payer: COMMERCIAL

## 2022-03-03 ENCOUNTER — TRANSFERRED RECORDS (OUTPATIENT)
Dept: HEALTH INFORMATION MANAGEMENT | Facility: CLINIC | Age: 52
End: 2022-03-03
Payer: COMMERCIAL

## 2022-04-11 ENCOUNTER — ANCILLARY PROCEDURE (OUTPATIENT)
Dept: CARDIOLOGY | Facility: CLINIC | Age: 52
End: 2022-04-11
Attending: INTERNAL MEDICINE
Payer: COMMERCIAL

## 2022-04-11 DIAGNOSIS — R00.0 TACHYCARDIA: ICD-10-CM

## 2022-04-11 DIAGNOSIS — I42.9 SECONDARY CARDIOMYOPATHY (H): ICD-10-CM

## 2022-04-11 DIAGNOSIS — C83.31 DIFFUSE LARGE B-CELL LYMPHOMA, LYMPH NODES OF HEAD, FACE, AND NECK (H): ICD-10-CM

## 2022-04-11 LAB
BI-PLANE LVEF ECHO: NORMAL
LVEF ECHO: NORMAL

## 2022-04-11 PROCEDURE — 93356 MYOCRD STRAIN IMG SPCKL TRCK: CPT | Performed by: INTERNAL MEDICINE

## 2022-04-11 PROCEDURE — 93308 TTE F-UP OR LMTD: CPT | Performed by: INTERNAL MEDICINE

## 2022-04-11 PROCEDURE — 93325 DOPPLER ECHO COLOR FLOW MAPG: CPT | Performed by: INTERNAL MEDICINE

## 2022-04-11 PROCEDURE — 93321 DOPPLER ECHO F-UP/LMTD STD: CPT | Performed by: INTERNAL MEDICINE

## 2022-04-11 RX ORDER — HEPARIN SODIUM (PORCINE) LOCK FLUSH IV SOLN 100 UNIT/ML 100 UNIT/ML
5 SOLUTION INTRAVENOUS ONCE
Status: COMPLETED | OUTPATIENT
Start: 2022-04-11 | End: 2022-04-11

## 2022-04-11 RX ADMIN — HEPARIN SODIUM (PORCINE) LOCK FLUSH IV SOLN 100 UNIT/ML 5 ML: 100 SOLUTION at 15:47

## 2022-04-11 RX ADMIN — Medication 7 ML: at 11:56

## 2022-04-28 ENCOUNTER — TELEPHONE (OUTPATIENT)
Dept: CARDIOLOGY | Facility: CLINIC | Age: 52
End: 2022-04-28

## 2022-04-28 NOTE — TELEPHONE ENCOUNTER
Pt was supposed to have virtual office visit today but patient is in [place] so unable to have visit.  Pt calling with medication questions -  Patient was hospitalized the past 3 days with shingles and BP was 88/57 per patient and all meds were held.  She is now home and wonders if she can restart meds-  BP this morning per home BP monitor = 120/78 HR 116bpm  Pt feels well - shingles drying up.    Per Dr. Martinez:  Restart Coreg 3.125mg twice a day and Losartan 25mg bid  Do not restart the hydrochlorothiazide today per Dr. martinez  Pt states last time we held the hydrochlorothiazide she developed edema  Pt will call me tomorrow with update on BP, HR and edema

## 2022-04-29 ENCOUNTER — TELEPHONE (OUTPATIENT)
Dept: CARDIOLOGY | Facility: CLINIC | Age: 52
End: 2022-04-29
Payer: COMMERCIAL

## 2022-04-29 NOTE — TELEPHONE ENCOUNTER
Pt calling regarding BP     todays BP is 105/61 w/HR 114bpm  Pt restarted coreg 3.125mg twice a day yesterday and Losartan 25mg twice a day  No hydrochlorothiazide  Slight swelling in fingers and ankles pt states.  Reviewed with Dr. Hopper - continue the same- no hydrochlorothiazide for now.  Pt to establish oncology and cardiology care in [place]  Call with questions.